# Patient Record
Sex: FEMALE | URBAN - METROPOLITAN AREA
[De-identification: names, ages, dates, MRNs, and addresses within clinical notes are randomized per-mention and may not be internally consistent; named-entity substitution may affect disease eponyms.]

---

## 2018-03-30 ENCOUNTER — IMPORTED ENCOUNTER (OUTPATIENT)
Dept: URBAN - METROPOLITAN AREA CLINIC 9 | Facility: CLINIC | Age: 63
End: 2018-03-30

## 2019-09-18 NOTE — PATIENT DISCUSSION
(U29.197) Keratoconjunct sicca, not specified as Sjogren's, bilateral - Assesment : Examination revealed Dry Eye Syndrome - Plan : Monitor for changes. ATs prn daily.

## 2021-10-16 ASSESSMENT — VISUAL ACUITY
OS_CC: 20/40 SN
OD_CC: 20/40 SN

## 2022-06-30 RX ORDER — CYCLOSPORINE 0.5 MG/ML
EMULSION OPHTHALMIC
COMMUNITY
End: 2022-09-06

## 2022-06-30 RX ORDER — ASPIRIN 81 MG/1
TABLET ORAL
COMMUNITY

## 2022-06-30 RX ORDER — LIFITEGRAST 50 MG/ML
SOLUTION/ DROPS OPHTHALMIC
COMMUNITY

## 2022-06-30 RX ORDER — ASCORBIC ACID 500 MG
TABLET ORAL
COMMUNITY
End: 2022-09-06

## 2022-06-30 RX ORDER — LOTEPREDNOL ETABONATE 5 MG/ML
SUSPENSION/ DROPS OPHTHALMIC
COMMUNITY
End: 2022-09-06

## 2022-10-03 PROBLEM — J32.9 SINUSITIS: Status: ACTIVE | Noted: 2022-10-03

## 2022-10-03 PROBLEM — K37 APPENDICITIS: Status: ACTIVE | Noted: 2022-10-03

## 2022-10-03 PROBLEM — E78.2 MIXED HYPERLIPIDEMIA: Status: ACTIVE | Noted: 2022-10-03

## 2022-10-03 PROBLEM — I21.9 MYOCARDIAL INFARCTION (HCC): Status: ACTIVE | Noted: 2022-10-03

## 2022-10-03 PROBLEM — S83.289A TEAR OF LATERAL MENISCUS OF KNEE: Status: ACTIVE | Noted: 2022-08-01

## 2022-10-03 PROBLEM — I65.23 CAROTID STENOSIS, BILATERAL: Status: ACTIVE | Noted: 2022-10-03

## 2022-10-03 PROBLEM — E03.9 ACQUIRED HYPOTHYROIDISM: Status: ACTIVE | Noted: 2022-10-03

## 2022-10-03 PROBLEM — S83.249A TEAR OF MEDIAL MENISCUS OF KNEE: Status: ACTIVE | Noted: 2022-08-01

## 2022-10-03 PROBLEM — E55.9 VITAMIN D DEFICIENCY, UNSPECIFIED: Status: ACTIVE | Noted: 2022-10-03

## 2022-10-03 PROBLEM — E03.8 OTHER SPECIFIED HYPOTHYROIDISM: Status: ACTIVE | Noted: 2022-10-03

## 2022-10-03 PROBLEM — E78.00 HYPERCHOLESTEROLEMIA: Status: ACTIVE | Noted: 2022-10-03

## 2022-10-03 PROBLEM — E78.2 APOLIPOPROTEIN E DEFICIENCY: Status: ACTIVE | Noted: 2022-10-03

## 2022-10-03 PROBLEM — Z95.5 STENTED CORONARY ARTERY: Status: ACTIVE | Noted: 2022-10-03

## 2022-10-03 PROBLEM — I77.9 DISORDER OF CAROTID ARTERY (HCC): Status: ACTIVE | Noted: 2022-10-03

## 2022-10-03 PROBLEM — I25.10 ATHEROSCLEROTIC HEART DISEASE OF NATIVE CORONARY ARTERY WITHOUT ANGINA PECTORIS: Status: ACTIVE | Noted: 2022-10-03

## 2022-10-03 PROBLEM — E05.90 HYPERTHYROIDISM: Status: ACTIVE | Noted: 2022-10-03

## 2022-10-03 PROBLEM — E66.9 OBESITY (BMI 30-39.9): Status: ACTIVE | Noted: 2022-10-03

## 2022-11-02 PROBLEM — J32.9 SINUSITIS: Status: RESOLVED | Noted: 2022-10-03 | Resolved: 2022-11-02

## 2023-01-23 NOTE — PATIENT DISCUSSION
Discussed pt's lifestyle and [place] expectations , enjoys being active and doing distance activities . Wishes to be able to see better for night driving and eliminating glare/halos around headlights . Does not mind wearing readers .